# Patient Record
Sex: MALE | Race: WHITE | NOT HISPANIC OR LATINO | Employment: FULL TIME | URBAN - METROPOLITAN AREA
[De-identification: names, ages, dates, MRNs, and addresses within clinical notes are randomized per-mention and may not be internally consistent; named-entity substitution may affect disease eponyms.]

---

## 2023-09-06 ENCOUNTER — TRANSFERRED RECORDS (OUTPATIENT)
Dept: HEALTH INFORMATION MANAGEMENT | Facility: CLINIC | Age: 48
End: 2023-09-06

## 2023-09-15 ENCOUNTER — TRANSFERRED RECORDS (OUTPATIENT)
Dept: HEALTH INFORMATION MANAGEMENT | Facility: CLINIC | Age: 48
End: 2023-09-15

## 2023-09-18 ENCOUNTER — TRANSFERRED RECORDS (OUTPATIENT)
Dept: HEALTH INFORMATION MANAGEMENT | Facility: CLINIC | Age: 48
End: 2023-09-18

## 2023-09-19 ENCOUNTER — TRANSFERRED RECORDS (OUTPATIENT)
Dept: HEALTH INFORMATION MANAGEMENT | Facility: CLINIC | Age: 48
End: 2023-09-19

## 2023-09-21 ENCOUNTER — TRANSFERRED RECORDS (OUTPATIENT)
Dept: HEALTH INFORMATION MANAGEMENT | Facility: CLINIC | Age: 48
End: 2023-09-21

## 2023-09-26 ENCOUNTER — TRANSFERRED RECORDS (OUTPATIENT)
Dept: HEALTH INFORMATION MANAGEMENT | Facility: CLINIC | Age: 48
End: 2023-09-26

## 2023-10-03 ENCOUNTER — TRANSFERRED RECORDS (OUTPATIENT)
Dept: HEALTH INFORMATION MANAGEMENT | Facility: CLINIC | Age: 48
End: 2023-10-03

## 2023-10-03 LAB — INR (EXTERNAL): 1.1 (ref 0.8–1.1)

## 2023-10-05 ENCOUNTER — TRANSCRIBE ORDERS (OUTPATIENT)
Dept: OTHER | Age: 48
End: 2023-10-05

## 2023-10-05 DIAGNOSIS — C19 COLORECTAL CANCER (H): Primary | ICD-10-CM

## 2023-10-05 DIAGNOSIS — C19 MALIGNANT NEOPLASM OF RECTOSIGMOID JUNCTION (H): ICD-10-CM

## 2023-10-06 ENCOUNTER — TRANSFERRED RECORDS (OUTPATIENT)
Dept: HEALTH INFORMATION MANAGEMENT | Facility: CLINIC | Age: 48
End: 2023-10-06

## 2023-10-06 ENCOUNTER — PRE VISIT (OUTPATIENT)
Dept: ONCOLOGY | Facility: CLINIC | Age: 48
End: 2023-10-06

## 2023-10-06 NOTE — TELEPHONE ENCOUNTER
Action November 10, 2023 9:09 AM ABT   Action Taken Called [facility] [phone], they were not able to find request. Verbally request records for 11/02/23 & 10/03/23 path reports, [facility] report and image reports 09/26/23 MR & 10/06/23 MR Brain    Called and spoke to pt, he has not been seen with [facility].    4:09 PM  Records from [place] received and sent to [facility] for upload (30 pages)     Imaging Received  November 2, 2023 12:41 PM ABT   Action: Images from [facility] received and resolved to PACS.     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 Dirk Beard  [facility]  [address] [place] [address]    Cristino Phelan, RN    10/11/23: Michelle Boyer FNP-C   OFFICE NOTE from Radiation onc [facility] 09/26/23: Dr. Marcio Estrada   OFFICE NOTE from surgeon [facility] 09/25/23: Dr. Marcio Morales   Molecular, next generation sequencing studies   (Recs are scanned in their system; common ones: Zachary Kwan, [facility]) [facility]   MEDICATION LIST External: [place]    PATHOLOGY REPORTS  (Path slides are not needed unless asked by the clinical team) External: [place] 09/15/23: [id number]  10/03/23: [id number]   IMAGING (NEED IMAGES & REPORT)     CT SCANS PACS Emma:  09/19/23: CT CAP   MRI PACS PACS:  09/26/23: MR Pelvis    Emma:  10/06/23: MR Brain   ULTRASOUND PACS Emma:  10/03/23: US Liver

## 2023-10-10 ENCOUNTER — PATIENT OUTREACH (OUTPATIENT)
Dept: ONCOLOGY | Facility: CLINIC | Age: 48
End: 2023-10-10

## 2023-10-10 NOTE — PROGRESS NOTES
DTC referral received from Dr Carmelita Beard, Onc at [facility], for pt with metastatic rectal cancer.      HX: 9/15/23 colonoscopy at [facility] [place] showing 2 rectal masses, bx + for mod diff adenocarcinoma. 9/18 CEA 58.4, 9/19 CT CAP with hepatic mets, 9/21 Pt met w/ Dr Mae at [place] to initiate care. 9/26 MRI liver (need this report/img), 10/6 MRI Brain (need this report/img), 9/25 Dr Morales Gen Surg consult at [place] (see in CE), 9/26 Dr Estrada ([place]? Need this note), liver bx ___when? (Need path report). Dr Mae sent for [employer] [facility] (need these reports).     Pt also getting recs at [facility] (?), [facility]. Per referral from Dr Mae, pt is interested in clinical trials. Will offer next available DTC consult for screening & evaluation at [facility].    Stoney Alonso RN  Oncology Nurse Navigator  [facility]  [phone]

## 2023-10-13 ENCOUNTER — TRANSFERRED RECORDS (OUTPATIENT)
Dept: HEALTH INFORMATION MANAGEMENT | Facility: CLINIC | Age: 48
End: 2023-10-13

## 2023-10-20 ENCOUNTER — TELEPHONE (OUTPATIENT)
Dept: ONCOLOGY | Facility: CLINIC | Age: 48
End: 2023-10-20

## 2023-10-20 NOTE — TELEPHONE ENCOUNTER
Spoke with patient's wife about clinical trials.  Overview of Phase 1 clinical trials, time commitment, housing, financial, etc.  She will let Stoney Alonso know if she wants a televisit or in person to meet with a DTC physician.

## 2023-10-30 ENCOUNTER — PATIENT OUTREACH (OUTPATIENT)
Dept: ONCOLOGY | Facility: CLINIC | Age: 48
End: 2023-10-30

## 2023-10-30 NOTE — PROGRESS NOTES
"I spoke to pt's wife, Kecia. She inquired if pt would be able to get a consult which discussed trial options as well as a \"general 2nd opinion\" regarding pt's current cancer care plan. Local Onc is Dr Mae, [facility], [facility].     We discussed that the role of DTC visit is to review work up data, plan of care to date and recommendations for future clinical trials that pt may be eligible for enrollment.     Any Onc staffing the DTC rotation would be able to discuss the topics above but Dr Sandra is likely best matched due to his GI cancer specialty and DTC experience at [facility]. Pt/wife accepted video visit w/ Dr Sandra 11/17 Fri 4pm; they will be within [place] borders for this video visit. Code as DTC since trial screening is the priority.    Stoney Alonso RN  Oncology Nurse Navigator  [facility]  [phone]       "

## 2023-11-02 ENCOUNTER — TRANSFERRED RECORDS (OUTPATIENT)
Dept: HEALTH INFORMATION MANAGEMENT | Facility: CLINIC | Age: 48
End: 2023-11-02
Payer: COMMERCIAL

## 2023-11-06 ENCOUNTER — TRANSFERRED RECORDS (OUTPATIENT)
Dept: HEALTH INFORMATION MANAGEMENT | Facility: CLINIC | Age: 48
End: 2023-11-06
Payer: COMMERCIAL

## 2023-11-06 LAB
ALT SERPL-CCNC: 15 IU/L (ref 7–52)
AST SERPL-CCNC: 16 IU/L (ref 13–39)
CREATININE (EXTERNAL): 0.92 MG/DL (ref 0.7–1.3)
GFR ESTIMATED (EXTERNAL): 88 ML/MIN/1.73M2
GFR ESTIMATED (IF AFRICAN AMERICAN) (EXTERNAL): >90 ML/MIN/1.73M2
GLUCOSE (EXTERNAL): 92 MG/DL (ref 70–105)
POTASSIUM (EXTERNAL): 4.6 MEQ/L (ref 3.5–5.1)

## 2023-11-10 ENCOUNTER — PATIENT OUTREACH (OUTPATIENT)
Dept: ONCOLOGY | Facility: CLINIC | Age: 48
End: 2023-11-10
Payer: COMMERCIAL

## 2023-11-10 ENCOUNTER — TRANSFERRED RECORDS (OUTPATIENT)
Dept: HEALTH INFORMATION MANAGEMENT | Facility: CLINIC | Age: 48
End: 2023-11-10
Payer: COMMERCIAL

## 2023-11-10 NOTE — TELEPHONE ENCOUNTER
[facility]: Cancer Care                                                                                          Returned call to spouse Kecia who stated pt has since received his [facility] report and she was wondering if more recent records had been received from [facility]. Informed her as pt is a new patient, there are navigational staff who requests records prior to his visit and the last note pertaining to receiving records was dated today. Asked her and pt to sign up for [employer] and they can also attach any new records they receive, [employer] sign up link texted to pt phone.    Signature:  Naveed Reyes RN

## 2023-11-17 ENCOUNTER — VIRTUAL VISIT (OUTPATIENT)
Dept: ONCOLOGY | Facility: CLINIC | Age: 48
End: 2023-11-17
Attending: INTERNAL MEDICINE
Payer: COMMERCIAL

## 2023-11-17 VITALS — WEIGHT: 236 LBS | BODY MASS INDEX: 28.74 KG/M2 | HEIGHT: 76 IN

## 2023-11-17 DIAGNOSIS — C20 RECTAL CANCER (H): Primary | ICD-10-CM

## 2023-11-17 PROCEDURE — 99205 OFFICE O/P NEW HI 60 MIN: CPT | Mod: 95 | Performed by: STUDENT IN AN ORGANIZED HEALTH CARE EDUCATION/TRAINING PROGRAM

## 2023-11-17 ASSESSMENT — PAIN SCALES - GENERAL: PAINLEVEL: NO PAIN (0)

## 2023-11-17 NOTE — PROGRESS NOTES
[facility] - Medical Oncology TeleHealth Consult Note  2023    Patient Identifiers     Name: Vern Oconnor  Preferred Address: [place]  Preferred Language: English  : 1975  Gender: male    Assessment and Plan     Mr. Vern Oconnor is a 48 year old male with minimal medical history who presents for initial virtual visit for metastatic rectal cancer.    NGS: KRAS G13D, NRAS wild type, AKT1 E17K, APC *, BCORL S292*, FREDERICK/RAFwt  Immuno: KARLA, TMB-low  Clinical Trial: N/A    Mr. Vern Oconnor presents for second opinion and management counseling for newly diagnosed metastatic rectal cancer. Given patient's metastatic disease is limited to the liver, we generally agree with FOLFOX-based chemotherapies. Per the CHINO trial, the addition of further chemotherapy and antibody agents may increase response rates if the treatment goal may be to consider subsequent liver-directed consolidation. Given the current size and number of liver lesions, he would not currently be a candidate for any locoregional liver-directed therapies.However, he may benefit from a Surgical Oncology consultation at the  to review his case.    Another option would be to consider Hepatic Arterial Infusion Pump therapy either as consolidation or as second-line therapy. These treatment options would depend on restaging following 3 months of initial treatment. We would recommend restaging with CT C/A/P and MR Liver at that time. For now, we recommend that patient continue treatment by his primary oncological team. We will arrange for imaging in 2 months followed by return visit with [facility] to consider the treatment options outlined above.    60 minutes spent on the date of the encounter doing chart review, review of test results, interpretation of tests, patient visit, and documentation     Diego Sandra MD, PhD   of Medicine  Division of Hematology, Oncology and Transplantation  [facility]  [place]    -----------------------------------    Oncology Summary     Cancer Staging   No matching staging information was found for the patient.      Oncology History    No history exists.       Subjective/Interval Events     - pt recently began FOLFOX chemotherapy for rectal cancer metastatic to liver. Unfortunately, there are greater than 10 lesions in the liver, with confluence  - tolerating treatment well  - eating okay. Stable weight  - nausea well controlled with PRN's    Objective Data     Lab data:  I have personally reviewed the lab data, notable for:    - CEA 28    Radiology data:  I have personally reviewed the radiology data, notable for:  10/06/2023 MR Brain  - Unremarkable    09/26/2023 MR Pelvis  - There is a high and low rectal malignancy  - MR stage: T3    Pathology and other data:  I have personally reviewed and interpreted the pathology data, notable for:    10/03/2023 Liver biopsy  Metastatic colorectal adenocarcinoma    Billing Stuff     Virtual Visit Details    Type of service:  Video Visit     Originating Location (pt. Location): Home    Distant Location (provider location):  On-site  Platform used for Video Visit: [employer]

## 2023-11-17 NOTE — NURSING NOTE
Is the patient currently in the state of [place]? YES    Visit mode:VIDEO    If the visit is dropped, the patient can be reconnected by: VIDEO VISIT: Send to e-mail at: [email or web address]    Will anyone else be joining the visit? NO  (If patient encounters technical issues they should call [phone]    How would you like to obtain your AVS? MyChart    Are changes needed to the allergy or medication list? No    Reason for visit: Video Visit (New apt )    Caitlyn MARIANO

## 2023-11-28 ENCOUNTER — TRANSFERRED RECORDS (OUTPATIENT)
Dept: HEALTH INFORMATION MANAGEMENT | Facility: CLINIC | Age: 48
End: 2023-11-28
Payer: COMMERCIAL

## 2023-11-28 ENCOUNTER — PATIENT OUTREACH (OUTPATIENT)
Dept: ONCOLOGY | Facility: CLINIC | Age: 48
End: 2023-11-28
Payer: COMMERCIAL

## 2023-11-28 NOTE — PROGRESS NOTES
New Patient Oncology Nurse Navigator Note     Referring provider: Dr. Sandra     Referring Clinic/Organization: [facility]     Referred to: Surgical Oncology -  Hepatobiliary / GI Cancers     Requested provider (if applicable): Dr. Aldair Sylvester     Referral Received: 11/28/23       Evaluation for : Colon cancer with liver mets      Clinical History (per Nurse review of records provided):      See book marked documents:     Referring MD office note  Pathology report  Imaging reports   Procedure report       No Known Allergies     No past medical history on file.    No past surgical history on file.    No current outpatient medications on file.        There is no problem list on file for this patient.        Clinical Assessment / Barriers to Care (Per Nurse):    None at this time.     Records Location:     [facility] Everywhere     Records Needed:     ABDOMINAL IMAGING (CT SCANS, MRI, US, PET SCANS)  DATING BACK TO 2018      Additional testing needed prior to consult:     NONE AT THIS TIME    Referral updates and Plan:       Consult with Surgical Oncology     11/28/2023 11:11 AM - Called and left a message for patient to call back and schedule consult.     11/28/2023 3:48 PM - called and spoke with patient, informed him we received a referral for him to meet with liver surgeon. He reports his wife handles the appointments, informed him I left her a message as well and if he would prefer they can call back when they are available. Informed him that we can do initial visit as virtual if he would like as Dr. Sylvester is licensed in [place]. He verbalized understanding and will have his wife call back to schedule.     Melinda Goncalves, RN, BSN   Surgical Oncology New Patient Nurse Navigator  [facility]  [phone]

## 2023-12-06 NOTE — TELEPHONE ENCOUNTER
RECORDS STATUS - ALL OTHER DIAGNOSIS      RECORDS RECEIVED FROM: [facility], [facility]   DATE RECEIVED:    NOTES STATUS DETAILS   OFFICE NOTE from referring provider Epic 11/17/23: Dr. Diego Sandra   OFFICE NOTE from medical oncologist External: [facility] 10/24/23: Dr. Dirk Beard   OFFICE NOTE from other specialist External: [facility] Rad Onc:  09/26/23: Dr. Marcio Estrada     Surgeon:  09/25/23: Dr. Marcio Morales   OPERATIVE REPORT External: [facility] 09/15/23: Colonoscopy   MEDICATION LIST [facility]    LABS     PATHOLOGY REPORTS Reports External: [facility] 09/15/23: [id number]  10/03/23: [id number]   ANYTHING RELATED TO DIAGNOSIS External: [facility] Most recent 11/06/23   GENONOMIC TESTING     TYPE: External: [facility]  09/23/23   IMAGING (NEED IMAGES & REPORT)     CT SCANS PACS 11/28/23, 09/1/23: CT CAP   MRI PACS 10/06/23: MR Brain  09/26/23: MR Pelvis   ULTRASOUND PACS 10/03/23: US Liver

## 2023-12-08 NOTE — PROGRESS NOTES
Virtual Visit Details    Type of service:  Video Visit     Originating Location (pt. Location): Home    Distant Location (provider location):  On-site  Platform used for Video Visit: [facility]            Department of Surgery  Division of Surgical Oncology    New Patient Consultation  Dec 11, 2023    Vern Oconnor is a 48 year old male who presents with CRLM.  He was referred by Dr Sandra. He is joined today by his wife.    History of Present Illness     Treatment summary (abbreviated for colorectal liver mets):    Pre-diagnosis symptoms: Hematochezia  Diagnosis date: 9/15/23  CEA at diagnosis: 58.4  Path:  -MMR status: pMMR/KARLA  -Other mutations (BRAF/KRAS/etc): KRAS G13D, NRAS WT, KEI WT    Liver disease first diagnosed: 9/15/23  Synchronous or metachronous: Synchronous  Evidence of extrahepatic metastases? No  Evidence of liver dysfunction (splenomegaly, LFT abnormalities, platelet count, etc)? No    Chemo:  1st line: FOLFOX + Avastin  2nd line: NA  Estimated number of chemo cycles including oxaliplatin: 5    Surgeries:  Primary: NA  -Path: NA  Liver: None    Liver biopsy? Yes  Date: 10/3/23  Modality: US  Dx: metastatic colorectal adenocarcinoma    Date of last colonoscopy: Dx, 9/15/23  Most recent CEA: 13    HPI/clinical updates:   Vern is a gentleman currently receiving care in [place] for Stage IV rectal adenocarcinoma. Colonoscopy revealed multifocal disease in his rectum, staged as T3 on MRI.         Past Medical History:   Diagnosis Date    Gastroesophageal reflux disease without esophagitis        History reviewed. No pertinent surgical history.    Current Outpatient Medications   Medication Sig Dispense Refill    magnesium 250 MG tablet Take 1 tablet by mouth daily      pantoprazole sodium (PROTONIX) 40 MG packet Take 1 packet by mouth daily      vitamin B-Complex Take 1 tablet by mouth daily          No Known Allergies     Social History:   reports that he has never smoked. He has never used smokeless  "tobacco.    Family History:  No family history on file.        Labs:  LFTs and plt count normal on labs 11/6/23 (see \"Media\" tab)    Imaging:  I have personally reviewed images from his CT 11/28/23. This showed diffuse bilobar hepatic disease. There are some borderline portal lymph nodes.    Outside Records:  CT CAP dated 9/19/23:  Multiple hypoenhancing lesions scattered throughout the liver, descried in both the right and left lobe. No lymphadenopathy or distant metastases. Rectal wall thickening.    Assessment & Plan     Vern Oconnor is a 48 year old male with Stage IV rectal adenocarcinoma.    The natural history of colorectal liver metastases was discussed with the patient and accompanying family members.    For patients with colon or rectal cancer that has metastasized to the liver (colorectal liver metastases, abbreviated CRLM), this represents Stage IV disease. We know that, unlike many other cancers, Stage IV colorectal cancer with metastases to the liver has the potential for cure through a combination of chemotherapy and surgery, assuming that patients have disease limited only to the colon/rectum and liver. In this case, the patient has diffuse bilobar liver disease but no other sites of disease outside of his rectum.     For cases like this, we recommend a combination of chemotherapy and surgery with the goal of cure.  The potential for complete pathologic response with chemotherapy alone is low, and we know that complete response on imaging does not correlate with pathologic findings at surgery. With a combination of chemotherapy and surgery (resection and/or ablation) that addresses all known lesions, survival rates can be as high as 40% at five years from diagnosis and 30% at 10 years from diagnosis. Important variables in determining long-term outcomes include tumor size, tumor number, timing from diagnosis to the development of liver metastases, whether the cancer is present in lymph nodes, tumor " markers, and tumor genetics, among many other variables.     Surgery can be incorporated at any point - before, during, or after chemotherapy or radiation - and the exact locations and sizes of the tumors in the liver dictate what surgical strategies are available. I pointed out that the treatment options and the exact order or sequence of the options can seem confusing; I reassured them that we would review their case in a multidisciplinary group and provide a clear recommendation for treatment.    In this case, I see numerous lesions in the liver. The size and location of these lesions are likely unresectable at this time. Therefore, I agree with the plan for systemic chemotherapy. We had a lengthy discussion about options that might include hepatic artery infusion pump. I did make it clear that there are no reports of hepatic artery infusion pump chemotherapy for patients with synchronous disease in their rectum. All HAIP treatments are done after taking out the primary tumor; having the primary tumor in place means we cannot reduce the dose of systemic chemotherapy, removing some of the benefit of HAIP. They asked excellent questions about options for HAIP with or without taking out his rectum. I made it clear that there is still an option here but it might be a treatment that is not supported by any data. Removing the rectum in someone with stage IV disease is rarely done due to the morbidity of the procedure and the possibility of complications that could delay the return to chemotherapy.    In summary, there are still many options for his care. I would continue systemic chemotherapy for 12 cycles and then re-evaluate.    All questions were answered to their apparent satisfaction, and contact information was provided should additional questions or concerns arise.    Finally, I recommended they visit the [facility] Facebook group and gain additional treatment information and patient experiences through that  resource.    Plan Summary:  -Follow-up CT CAP (to be done locally) after 12th cycle of chemo; Dr. Sandra and I can review his imaging once available and determine what appointments/recommendations to make      Aldair Sylvester MD MPHS      Today's visit was centered around a known metastatic cancer diagnosis. The risk of additional morbidity or mortality with or without further treatment is high. Total time spent was 90 minutes, including but not limited to patient-facing time, chart review, review of tests/studies as noted above, and care coordination.

## 2023-12-11 ENCOUNTER — PRE VISIT (OUTPATIENT)
Dept: SURGERY | Facility: CLINIC | Age: 48
End: 2023-12-11
Payer: COMMERCIAL

## 2023-12-11 ENCOUNTER — VIRTUAL VISIT (OUTPATIENT)
Dept: SURGERY | Facility: CLINIC | Age: 48
End: 2023-12-11
Attending: STUDENT IN AN ORGANIZED HEALTH CARE EDUCATION/TRAINING PROGRAM
Payer: COMMERCIAL

## 2023-12-11 VITALS — HEIGHT: 76 IN | WEIGHT: 245 LBS | BODY MASS INDEX: 29.83 KG/M2

## 2023-12-11 DIAGNOSIS — C78.7 RECTAL ADENOCARCINOMA METASTATIC TO LIVER (H): Primary | ICD-10-CM

## 2023-12-11 DIAGNOSIS — C20 RECTAL ADENOCARCINOMA METASTATIC TO LIVER (H): Primary | ICD-10-CM

## 2023-12-11 DIAGNOSIS — C20 RECTAL CANCER (H): ICD-10-CM

## 2023-12-11 PROCEDURE — 99417 PROLNG OP E/M EACH 15 MIN: CPT | Mod: VID | Performed by: SURGERY

## 2023-12-11 PROCEDURE — 99205 OFFICE O/P NEW HI 60 MIN: CPT | Mod: VID | Performed by: SURGERY

## 2023-12-11 RX ORDER — PANTOPRAZOLE SODIUM 40 MG/1
1 FOR SUSPENSION ORAL DAILY
COMMUNITY

## 2023-12-11 RX ORDER — MULTIVITAMIN WITH IRON
1 TABLET ORAL DAILY
COMMUNITY

## 2023-12-11 ASSESSMENT — PAIN SCALES - GENERAL: PAINLEVEL: NO PAIN (0)

## 2023-12-11 NOTE — NURSING NOTE
Is the patient currently in the state of [place]? NO Patient is in [place], provider licensed for [place]    Visit mode:VIDEO    If the visit is dropped, the patient can be reconnected by: VIDEO VISIT: Send to e-mail at: [email or web address]    Will anyone else be joining the visit? NO  (If patient encounters technical issues they should call [phone]    How would you like to obtain your AVS? MyChart    Are changes needed to the allergy or medication list? Pt stated no changes to allergies and Pt stated no med changes    Reason for visit: Consult    Asia Leung LPN

## 2023-12-11 NOTE — LETTER
12/11/2023         RE: Vern Oconnor  [address] [facility] [address]        Dear Colleague,    Thank you for referring your patient, Vern Oconnor, to the [facility]. Please see a copy of my visit note below.    Virtual Visit Details    Type of service:  Video Visit     Originating Location (pt. Location): Home    Distant Location (provider location):  On-site  Platform used for Video Visit: [facility]            Department of Surgery  Division of Surgical Oncology    New Patient Consultation  Dec 11, 2023    Vern Oconnor is a 48 year old male who presents with CRLM.  He was referred by Dr Sandra. He is joined today by his wife.    History of Present Illness    Treatment summary (abbreviated for colorectal liver mets):    Pre-diagnosis symptoms: Hematochezia  Diagnosis date: 9/15/23  CEA at diagnosis: 58.4  Path:  -MMR status: pMMR/KARLA  -Other mutations (BRAF/KRAS/etc): KRAS G13D, NRAS WT, KEI WT    Liver disease first diagnosed: 9/15/23  Synchronous or metachronous: Synchronous  Evidence of extrahepatic metastases? No  Evidence of liver dysfunction (splenomegaly, LFT abnormalities, platelet count, etc)? No    Chemo:  1st line: FOLFOX + Avastin  2nd line: NA  Estimated number of chemo cycles including oxaliplatin: 5    Surgeries:  Primary: NA  -Path: NA  Liver: None    Liver biopsy? Yes  Date: 10/3/23  Modality: US  Dx: metastatic colorectal adenocarcinoma    Date of last colonoscopy: Dx, 9/15/23  Most recent CEA: 13    HPI/clinical updates:   Vern is a gentleman currently receiving care in [place] for Stage IV rectal adenocarcinoma. Colonoscopy revealed multifocal disease in his rectum, staged as T3 on MRI.         Past Medical History:   Diagnosis Date    Gastroesophageal reflux disease without esophagitis        History reviewed. No pertinent surgical history.    Current Outpatient Medications   Medication Sig Dispense Refill    magnesium 250 MG tablet Take 1 tablet by mouth daily       "pantoprazole sodium (PROTONIX) 40 MG packet Take 1 packet by mouth daily      vitamin B-Complex Take 1 tablet by mouth daily          No Known Allergies     Social History:   reports that he has never smoked. He has never used smokeless tobacco.    Family History:  No family history on file.        Labs:  LFTs and plt count normal on labs 11/6/23 (see \"Media\" tab)    Imaging:  I have personally reviewed images from his CT 11/28/23. This showed diffuse bilobar hepatic disease. There are some borderline portal lymph nodes.    Outside Records:  CT CAP dated 9/19/23:  Multiple hypoenhancing lesions scattered throughout the liver, descried in both the right and left lobe. No lymphadenopathy or distant metastases. Rectal wall thickening.    Assessment & Plan    Vern Oconnor is a 48 year old male with Stage IV rectal adenocarcinoma.    The natural history of colorectal liver metastases was discussed with the patient and accompanying family members.    For patients with colon or rectal cancer that has metastasized to the liver (colorectal liver metastases, abbreviated CRLM), this represents Stage IV disease. We know that, unlike many other cancers, Stage IV colorectal cancer with metastases to the liver has the potential for cure through a combination of chemotherapy and surgery, assuming that patients have disease limited only to the colon/rectum and liver. In this case, the patient has diffuse bilobar liver disease but no other sites of disease outside of his rectum.     For cases like this, we recommend a combination of chemotherapy and surgery with the goal of cure.  The potential for complete pathologic response with chemotherapy alone is low, and we know that complete response on imaging does not correlate with pathologic findings at surgery. With a combination of chemotherapy and surgery (resection and/or ablation) that addresses all known lesions, survival rates can be as high as 40% at five years from diagnosis and " 30% at 10 years from diagnosis. Important variables in determining long-term outcomes include tumor size, tumor number, timing from diagnosis to the development of liver metastases, whether the cancer is present in lymph nodes, tumor markers, and tumor genetics, among many other variables.     Surgery can be incorporated at any point - before, during, or after chemotherapy or radiation - and the exact locations and sizes of the tumors in the liver dictate what surgical strategies are available. I pointed out that the treatment options and the exact order or sequence of the options can seem confusing; I reassured them that we would review their case in a multidisciplinary group and provide a clear recommendation for treatment.    In this case, I see numerous lesions in the liver. The size and location of these lesions are likely unresectable at this time. Therefore, I agree with the plan for systemic chemotherapy. We had a lengthy discussion about options that might include hepatic artery infusion pump. I did make it clear that there are no reports of hepatic artery infusion pump chemotherapy for patients with synchronous disease in their rectum. All HAIP treatments are done after taking out the primary tumor; having the primary tumor in place means we cannot reduce the dose of systemic chemotherapy, removing some of the benefit of HAIP. They asked excellent questions about options for HAIP with or without taking out his rectum. I made it clear that there is still an option here but it might be a treatment that is not supported by any data. Removing the rectum in someone with stage IV disease is rarely done due to the morbidity of the procedure and the possibility of complications that could delay the return to chemotherapy.    In summary, there are still many options for his care. I would continue systemic chemotherapy for 12 cycles and then re-evaluate.    All questions were answered to their apparent satisfaction,  and contact information was provided should additional questions or concerns arise.    Finally, I recommended they visit the [employer] Facebook group and gain additional treatment information and patient experiences through that resource.    Plan Summary:  -Follow-up CT CAP (to be done locally) after 12th cycle of chemo; Dr. Sandra and I can review his imaging once available and determine what appointments/recommendations to make      Aldair Sylvester MD MPHS      Today's visit was centered around a known metastatic cancer diagnosis. The risk of additional morbidity or mortality with or without further treatment is high. Total time spent was 90 minutes, including but not limited to patient-facing time, chart review, review of tests/studies as noted above, and care coordination.

## 2023-12-11 NOTE — PROGRESS NOTES
"Virtual Visit Details    Type of service:  Video Visit     Originating Location (pt. Location): {video visit patient location:205750::\"Home\"}  {PROVIDER LOCATION On-site should be selected for visits conducted from your clinic location or adjoining [facility] hospital, academic office, or other nearby [facility] building. Off-site should be selected for all other provider locations, including home:175512}  Distant Location (provider location):  {virtual location provider:506202}  Platform used for Video Visit: {Virtual Visit Platforms:808424::\"[employer]\"}    "

## 2024-01-18 ENCOUNTER — PATIENT OUTREACH (OUTPATIENT)
Dept: ONCOLOGY | Facility: CLINIC | Age: 49
End: 2024-01-18
Payer: COMMERCIAL

## 2024-01-18 NOTE — PROGRESS NOTES
[facility]: Cancer Care Follow-Up Note                                    Discussion with Patient or Care Team:                                                      Called and left a message with oncology clinic to get update on patients treatment schedule and tentative completion date and restaging scans. Provided my direct line for call back with info.     Dates of Treatment:                                                      Patient getting chemo at outside hospital, plan is for 12 cycles of chemotherapy and restaging imaging.       Assessment:                                                      No assessment indicated    Intervention/Education provided during outreach:                                                       None at this time.       Plan/Follow up:                                                         To be determined pending timing of chemo completion.       Melinda Goncalves RN   Hepatobiliary and Surgical Oncology RNCC

## 2024-01-18 NOTE — PROGRESS NOTES
[facility]: Cancer Care Follow-Up Note                                    Discussion with Patient or Care Team:                                                      Received call back from patients local oncology clinic confirming he has completed 8 cycles of chemotherapy. He is tentatively scheduled for follow up scan on 3/20 pending no delays in treatment, with a follow up with provider the following week.     Dates of Treatment:                                                      Completed 8 cycles.       Assessment:                                                        No assessment indicated    Intervention/Education provided during outreach:                                                       None at this time.       Plan/Follow up:                                                         Will follow up closer to end of treatment to confirm no delays and request outside scans.      Melinda Goncalves RN   Hepatobiliary and Surgical Oncology RNCC

## 2024-03-04 ENCOUNTER — PATIENT OUTREACH (OUTPATIENT)
Dept: SURGERY | Facility: CLINIC | Age: 49
End: 2024-03-04
Payer: COMMERCIAL

## 2024-03-04 NOTE — PROGRESS NOTES
[facility]: Cancer Care Follow-Up Note                                    Discussion with Patient or Care Team:                                                      Returned call to patients wife regarding scheduling follow up. Per report patient is scheduled to finish cycle 12 of chemotherapy on 3/11 and is scheduled for follow up MRI and CT scan on 3/20. Informed her that we will confirm if scans can be pushed and then schedule a follow up based on timing if when we know we can have scans in the system for the visit. Informed her our office will be in touch in the coming week to get the follow up with Dr. Sylvester scheduled.     Informed her I would update Dr. Sandra's team to decide on the timing of their visit as it would be determined by his office.     Dates of Treatment:                                                      Infusion given in last 28 days       None              Assessment:                                                        No assessment indicated      Plan/Follow up:                                                         Message sent to scheduling to contact patient to schedule.         Meilnda Goncalves RN   Hepatobiliary and Surgical Oncology RNCC

## 2024-03-17 NOTE — PROGRESS NOTES
Department of Surgery  Division of Surgical Oncology    Follow-Up  Mar 25, 2024    Vern Oconnor is a 48 year old male who is being followed for CRLM. Last seen on 12/11/23.    He is joined today by his wife.    History of Present Illness   Briefly, Vern was diagnosed with rectal cancer in 9/2023, pMMR, KRAS mut, BRAF wt, synchronous disease confirmed with liver biopsy.    He has been receiving FOLFOX + Avastin since 10/11/23. He has completed 12 cycles. CEA in January was 3.2. He has been mildly thrombocytopenia during treatment. He has had a left gluteal lesion that has been bleeding intermittent; it was biopsied in November, with pathology reporting this to be a benign cyst.    He is scheduled to see Dr. Zhang at [facility] on 3/27/24.  He is at [facility] today and planning to get a rectal MRI followed by medical oncology visit later today.    CEA March 19 1.4    He reports having done quite well on chemotherapy.  He has minimal neuropathy.  He is maintaining his weight.  He says his local oncologist is looking forward to recommendations from the procedure for [facility] to make decisions on the next treatment plan.    Results Reviewed:  Labs:  MRI from     I, Aldair Sylvester, personally reviewed the above studies.      Assessment & Plan     Vern Oconnor is a 48 year old male s/p 12 cycles of FOLFOX + avastin for CRLM.    I reviewed Vern's most recent MRI.  I do not have the outside report, but we will see the images.  Of note he did a rectal MRI at [facility] later today so it is unclear from my assessment this case currently it is primary tumor responding the same and that his liver tumors are.    On review of his liver MRI it is clear he still has by lobar disease, with about 20 lesions scattered throughout both lobes.  His left lobe is a little bit on the small side and are several lesions on that side so I do not see any good option for resection review the right of the left to help us achieve clearance and a staged approach.   He has had a great response to his chemotherapy so I do not think that surgical intervention, either with resection, ablation, or pump, will substantially change the outcome at this point in time.  Pump chemotherapy could help him further decrease the size of these lesions but systemic therapy is already doing that so I would continue this approach.  I do recognize that he has gone through 12 cycles of oxaliplatin so I agree with his local oncologist that we should probably transition away from that to reduce his neurotoxicity as well as hepatotoxicity.    Overall my summary is that he has had excellent spots to systems chemotherapy.  He remains unresectable.  I would continue with systemic chemotherapy, anticipating a regimen similar to FOLFIRI plus Avastin.  I would think that he would undergo this for about 2 months and then get additional imaging.  We discussed whether he has come to the closer to [place] for imaging.  I think if he continue with a nonoperative approach as I am recommending at this point he can go ahead and try and get his imaging done locally, we can review the imaging, and imaging determine if he needs improved high-resolution images.  He was aware that I might have our radiologist over read local images and that that would be additional bill but could be a way to offer him some convenience.    We have also discussed the management of his primary tumor.  We would do liver directed therapy, we should also consider with due to his rectum.  By enlarge, upon chemotherapy is often considered in patients with a primary removed, but with rectal cancer, this can be much more difficult discussion.  He has not seen a colorectal surgeon or had a new scope since his diagnosis.  I do not think this is necessary at this time, but we did briefly discuss that he might need that in the future if we are pushing forward towards additional interventions with curative intent.    All questions were answered to  their apparent satisfaction, and contact information was provided should additional questions or concerns arise.    Plan Summary:  -Await results of [facility] visits today with Arizmendi and Dr. Sandra here  -He is seeing [facility] later this week and is aware their opinion may be different than mine  -I am recommending additional 2mo of chemotherapy (regimen change, stopping oxali in favor of iri) followed by imaging locally. We will reach back out to him after his clinic visits to see if he is continuing care with us, [facility], or wishes to continue seeking multiple opinions.       Aldair Sylvester MD MPHS      Total time spent was 60 minutes, including but not limited to patient-facing time, chart review, review of tests/studies as noted above, and care coordination.

## 2024-03-21 ENCOUNTER — PATIENT OUTREACH (OUTPATIENT)
Dept: SURGERY | Facility: CLINIC | Age: 49
End: 2024-03-21
Payer: COMMERCIAL

## 2024-03-21 NOTE — PROGRESS NOTES
[facility]: Cancer Care Follow-Up Note                                    Discussion with Patient or Care Team:                                                      Called and spoke with patient's wife to confirm if patient had his scans as previously discussed and plan for 3/20. Per report his scans were moved to [place] and are not being done until 3/24 and then an MRI on 3/26. Informed her that ideally we would reschedule the appointments and I can confirmed we will be able to get the imaging from [place]. This was frustrating to her as she stated that the imaging would be available for his visits at [place], explained these are two different systems and in my experience it has take a few days for scan from [place] to be sent to our system, especially if the official reports are not available. She requested appointment on Wednesday, explained Dr. Sylvester is in clinic on Mondays and every other Friday and operate Tuesday,Wednesday and Thursday so its not an option. Also explained I would have to have scheduling contact her to discuss next option and also review openings with Dr. Sandra's team.     After much discussion, explained I would keep the appointments on Monday for now, but if we don't have imaging by 9 am that I would have to have scheduling reach out and reschedule appointments to later date to ensure we have all work up and imaging available for the appointment. Explained Dr. Sylvester would not be able to give any recommendations without having updated imaging to evaluate for response. Recommend she ask the radiology team on Sunday to send the information to our system to assist in ensuring we get all the information.     She verbalized understanding and is aware I will send a message to Dr. Sandra's team to decide on how to manage their follow up appointment.     Dates of Treatment:                                                      Infusion given in last 28 days       None            No past surgical  history on file.       Assessment:                                                        No assessment indicated    Intervention/Education provided during outreach:                                                       None.       Plan/Follow up:                                                         Patient to follow up as scheduled at next appt      Melinda Goncalves RN   Hepatobiliary and Surgical Oncology RNCC

## 2024-03-25 ENCOUNTER — VIRTUAL VISIT (OUTPATIENT)
Dept: SURGERY | Facility: CLINIC | Age: 49
End: 2024-03-25
Attending: STUDENT IN AN ORGANIZED HEALTH CARE EDUCATION/TRAINING PROGRAM
Payer: COMMERCIAL

## 2024-03-25 ENCOUNTER — VIRTUAL VISIT (OUTPATIENT)
Dept: ONCOLOGY | Facility: CLINIC | Age: 49
End: 2024-03-25
Attending: STUDENT IN AN ORGANIZED HEALTH CARE EDUCATION/TRAINING PROGRAM
Payer: COMMERCIAL

## 2024-03-25 VITALS — BODY MASS INDEX: 30.2 KG/M2 | WEIGHT: 248 LBS | HEIGHT: 76 IN

## 2024-03-25 VITALS — HEIGHT: 76 IN | BODY MASS INDEX: 30.2 KG/M2 | WEIGHT: 248 LBS

## 2024-03-25 DIAGNOSIS — C78.7 RECTAL ADENOCARCINOMA METASTATIC TO LIVER (H): Primary | ICD-10-CM

## 2024-03-25 DIAGNOSIS — C20 RECTAL CANCER (H): Primary | ICD-10-CM

## 2024-03-25 DIAGNOSIS — C20 RECTAL ADENOCARCINOMA METASTATIC TO LIVER (H): Primary | ICD-10-CM

## 2024-03-25 PROCEDURE — 99215 OFFICE O/P EST HI 40 MIN: CPT | Mod: 95 | Performed by: STUDENT IN AN ORGANIZED HEALTH CARE EDUCATION/TRAINING PROGRAM

## 2024-03-25 PROCEDURE — 99215 OFFICE O/P EST HI 40 MIN: CPT | Mod: 95 | Performed by: SURGERY

## 2024-03-25 PROCEDURE — 99417 PROLNG OP E/M EACH 15 MIN: CPT | Mod: 95 | Performed by: SURGERY

## 2024-03-25 ASSESSMENT — PAIN SCALES - GENERAL
PAINLEVEL: NO PAIN (0)
PAINLEVEL: NO PAIN (0)

## 2024-03-25 NOTE — LETTER
3/25/2024         RE: Vern Oconnor  [address] [place] [address]        Dear Colleague,    Thank you for referring your patient, Vern Oconnor, to the [facility]. Please see a copy of my visit note below.    [facility] - Medical Oncology TeleHealth Consult Note  3/25/2024    Patient Identifiers     Name: Vern Oconnor  Preferred Address: [place]  Preferred Language: English  : 1975  Gender: male    Assessment and Plan     Mr. 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    NGS: KRAS G13D, NRAS wild type, AKT1 E17K, APC *, BCORL S292*, FREDERICK/RAFwt  Immuno: KARLA, TMB-low  Clinical Trial: N/A    Mr. Oconnor returns to clinic for continuing second opinion consultation of his metastatic colorectal cancer; mets exclusively to the liver.  Patient's most recent imaging does not demonstrate distributed intra-abdominal disease.  He continues to have liver exclusive metastases, which would be amenable to liver directed treatment.  However, he has responded extremely well to his current regimen, and we agree with both his primary oncologist and our colleagues at [place] that he should continue his current systemic therapy as 5-FU/Abi maintenance.  Given the complexities associated with 5-FU pumps at his home infusion center, he may benefit from a transition to capecitabine after 1 or 2 cycles of 5-FU/Abi.    Of note, KRAS wild-type finding in liver metastatic disease is consistent with reported literature suggesting that this KRAS mutational regression is consistent with oligometastatic disease and with improved prognosis from KRAS mutant CRC.    Regarding potential future liver-directed management, we generally feel that patient's liver disease burden is not appropriate for locoregional therapies, as any  targeted ablative therapy would damage a significant portion of the liver and compromise function.  Again, we agree with [facility] that hepatic arterial infusion pump therapy is the most appropriate treatment option on disease progression.  At that time, patient should be reevaluated by our surgical oncology and colorectal surgery teams, and we can plan for palliative debulking of extrahepatic disease followed by HAIP FUDR/dr-FOLFIRI.    As we expect continued disease response to current regimen for further 6 to 12 months, we will arrange for follow-up in 6 months, to reevaluate patient's disease status and provide further management recommendations.  If patient is noted to have disease progression prior to that point, we recommend he reach out to our clinic and establish follow-up sooner.    45 minutes spent on the date of the encounter doing chart review, review of test results, interpretation of tests, patient visit, and documentation     Diego Sandra MD, PhD   of Medicine  Division of Hematology, Oncology and Transplantation  [facility]    -----------------------------------    Oncology Summary     Cancer Staging   No matching staging information was found for the patient.      Oncology History    No history exists.       Subjective/Interval Events     - he continues to feel generally well without significant treatment-associated side effects  - his primary complaint with treatment is the unwieldy nature of the 5FU pumps. He notes that they are like 'gallon jugs,' and are a significant impediment to his quality of life.   - no constipation, diarrhea, nausea, vomiting  - stable neuropathy    Objective Data     Lab data:  I have personally reviewed the lab data, notable for:    - no notables; all reviewed    Radiology data:  I have personally reviewed the radiology data, notable for:  03/25/2024 MR Abdomen  Impression  Significant interval decrease in the size of the multiple  liver metastases. Scarring adjacent to the liver metastases suggestive of early changes of chemotherapy related scarring ([facility]M).  Interval increase in the spleen likely related to chemotherapy.    Pathology and other data:  I have personally reviewed and interpreted the pathology data, notable for:    - no new data    Billing Stuff     Virtual Visit Details    Type of service:  Video Visit     Originating Location (pt. Location): Home    Distant Location (provider location):  Off-site  Platform used for Video Visit: [facility]        Diego Sandra MD

## 2024-03-25 NOTE — PROGRESS NOTES
Virtual Visit Details    Type of service:  Video Visit     Originating Location (pt. Location): [place], [place]    Distant Location (provider location):  On-site  Platform used for Video Visit: [employer]

## 2024-03-25 NOTE — LETTER
3/25/2024         RE: Vern Oconnor  [address] [place] [address]        Dear Colleague,    Thank you for referring your patient, Vern Oconnor, to the [facility]. Please see a copy of my visit note below.      Department of Surgery  Division of Surgical Oncology    Follow-Up  Mar 25, 2024    Vern Oconnor is a 48 year old male who is being followed for CRLM. Last seen on 12/11/23.    He is joined today by his wife.    History of Present Illness  Briefly, Vern was diagnosed with rectal cancer in 9/2023, pMMR, KRAS mut, BRAF wt, synchronous disease confirmed with liver biopsy.    He has been receiving FOLFOX + Avastin since 10/11/23. He has completed 12 cycles. CEA in January was 3.2. He has been mildly thrombocytopenia during treatment. He has had a left gluteal lesion that has been bleeding intermittent; it was biopsied in November, with pathology reporting this to be a benign cyst.    He is scheduled to see Dr. Zhang at [facility] on 3/27/24.  He is at [facility] today and planning to get a rectal MRI followed by medical oncology visit later today.    CEA March 19 1.4    He reports having done quite well on chemotherapy.  He has minimal neuropathy.  He is maintaining his weight.  He says his local oncologist is looking forward to recommendations from the procedure for [facility] to make decisions on the next treatment plan.    Results Reviewed:  Labs:  MRI from     I, Aldair Sylvester, personally reviewed the above studies.      Assessment & Plan    Vern Oconnor is a 48 year old male s/p 12 cycles of FOLFOX + avastin for CRLM.    I reviewed Vern's most recent MRI.  I do not have the outside report, but we will see the images.  Of note he did a rectal MRI at [facility] later today so it is unclear from my assessment this case currently it is primary tumor responding the same and that his liver tumors are.    On review of his liver MRI it is clear he still has by lobar disease, with about 20 lesions  scattered throughout both lobes.  His left lobe is a little bit on the small side and are several lesions on that side so I do not see any good option for resection review the right of the left to help us achieve clearance and a staged approach.  He has had a great response to his chemotherapy so I do not think that surgical intervention, either with resection, ablation, or pump, will substantially change the outcome at this point in time.  Pump chemotherapy could help him further decrease the size of these lesions but systemic therapy is already doing that so I would continue this approach.  I do recognize that he has gone through 12 cycles of oxaliplatin so I agree with his local oncologist that we should probably transition away from that to reduce his neurotoxicity as well as hepatotoxicity.    Overall my summary is that he has had excellent spots to systems chemotherapy.  He remains unresectable.  I would continue with systemic chemotherapy, anticipating a regimen similar to FOLFIRI plus Avastin.  I would think that he would undergo this for about 2 months and then get additional imaging.  We discussed whether he has come to the closer to [place] for imaging.  I think if he continue with a nonoperative approach as I am recommending at this point he can go ahead and try and get his imaging done locally, we can review the imaging, and imaging determine if he needs improved high-resolution images.  He was aware that I might have our radiologist over read local images and that that would be additional bill but could be a way to offer him some convenience.    We have also discussed the management of his primary tumor.  We would do liver directed therapy, we should also consider with due to his rectum.  By enlarge, upon chemotherapy is often considered in patients with a primary removed, but with rectal cancer, this can be much more difficult discussion.  He has not seen a colorectal surgeon or had a new scope  since his diagnosis.  I do not think this is necessary at this time, but we did briefly discuss that he might need that in the future if we are pushing forward towards additional interventions with curative intent.    All questions were answered to their apparent satisfaction, and contact information was provided should additional questions or concerns arise.    Plan Summary:  -Await results of [facility] visits today with Arizmendi and Dr. Sandra here  -He is seeing [facility] later this week and is aware their opinion may be different than mine  -I am recommending additional 2mo of chemotherapy (regimen change, stopping oxali in favor of iri) followed by imaging locally. We will reach back out to him after his clinic visits to see if he is continuing care with us, [place], or wishes to continue seeking multiple opinions.       Aldair Sylvester MD MPHS      Total time spent was 60 minutes, including but not limited to patient-facing time, chart review, review of tests/studies as noted above, and care coordination.      Virtual Visit Details    Type of service:  Video Visit     Originating Location (pt. Location): [place], [place]    Distant Location (provider location):  On-site  Platform used for Video Visit: Johnnie

## 2024-03-25 NOTE — NURSING NOTE
Is the patient currently in the state of [place]? YES    Visit mode:VIDEO Doximity     If the visit is dropped, the patient can be reconnected by: VIDEO VISIT: Text to cell phone:   Telephone Information:   Mobile [phone]       Will anyone else be joining the visit? Yes, pt's wife Kecia is with the pt and will be joining the video visit per pt  (If patient encounters technical issues they should call [phone]    How would you like to obtain your AVS? Mail a copy    Are changes needed to the allergy or medication list? No    Reason for visit: RECHECK    No other vitals to report per pt     Eulalia MENDEZF

## 2024-03-25 NOTE — PROGRESS NOTES
[facility] - Medical Oncology TeleHealth Consult Note  3/25/2024    Patient Identifiers     Name: Vern Oconnor  Preferred Address: [place]  Preferred Language: English  : 1975  Gender: male    Assessment and Plan     Mr. 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    NGS: KRAS G13D, NRAS wild type, AKT1 E17K, APC *, BCORL S292*, FREDERICK/RAFwt  Immuno: KARLA, TMB-low  Clinical Trial: N/A    Mr. Oconnor returns to clinic for continuing second opinion consultation of his metastatic colorectal cancer; mets exclusively to the liver.  Patient's most recent imaging does not demonstrate distributed intra-abdominal disease.  He continues to have liver exclusive metastases, which would be amenable to liver directed treatment.  However, he has responded extremely well to his current regimen, and we agree with both his primary oncologist and our colleagues at [place] that he should continue his current systemic therapy as 5-FU/Abi maintenance.  Given the complexities associated with 5-FU pumps at his home infusion center, he may benefit from a transition to capecitabine after 1 or 2 cycles of 5-FU/Abi.    Of note, KRAS wild-type finding in liver metastatic disease is consistent with reported literature suggesting that this KRAS mutational regression is consistent with oligometastatic disease and with improved prognosis from KRAS mutant CRC.    Regarding potential future liver-directed management, we generally feel that patient's liver disease burden is not appropriate for locoregional therapies, as any targeted ablative therapy would damage a significant portion of the liver and compromise function.  Again, we agree with [facility] that hepatic arterial infusion pump therapy is the most appropriate treatment option on disease progression.   At that time, patient should be reevaluated by our surgical oncology and colorectal surgery teams, and we can plan for palliative debulking of extrahepatic disease followed by HAIP FUDR/dr-FOLFIRI.    As we expect continued disease response to current regimen for further 6 to 12 months, we will arrange for follow-up in 6 months, to reevaluate patient's disease status and provide further management recommendations.  If patient is noted to have disease progression prior to that point, we recommend he reach out to our clinic and establish follow-up sooner.    45 minutes spent on the date of the encounter doing chart review, review of test results, interpretation of tests, patient visit, and documentation     Diego Sandra MD, PhD   of Medicine  Division of Hematology, Oncology and Transplantation  [facility]    -----------------------------------    Oncology Summary     Cancer Staging   No matching staging information was found for the patient.      Oncology History    No history exists.       Subjective/Interval Events     - he continues to feel generally well without significant treatment-associated side effects  - his primary complaint with treatment is the unwieldy nature of the 5FU pumps. He notes that they are like 'gallon jugs,' and are a significant impediment to his quality of life.   - no constipation, diarrhea, nausea, vomiting  - stable neuropathy    Objective Data     Lab data:  I have personally reviewed the lab data, notable for:    - no notables; all reviewed    Radiology data:  I have personally reviewed the radiology data, notable for:  03/25/2024 MR Abdomen  Impression  Significant interval decrease in the size of the multiple liver metastases. Scarring adjacent to the liver metastases suggestive of early changes of chemotherapy related scarring (CALMCHeM).  Interval increase in the spleen likely related to chemotherapy.    Pathology and other data:  I have personally  reviewed and interpreted the pathology data, notable for:    - no new data    Billing Stuff     Virtual Visit Details    Type of service:  Video Visit     Originating Location (pt. Location): Home    Distant Location (provider location):  Off-site  [facility]

## 2024-03-25 NOTE — NURSING NOTE
Is the patient currently in the state of [place]? YES    Visit mode:VIDEO    If the visit is dropped, the patient can be reconnected by: VIDEO VISIT: Text to cell phone:   Telephone Information:   Mobile [phone]       Will anyone else be joining the visit? NO  (If patient encounters technical issues they should call [phone])    How would you like to obtain your AVS? MyChart    Are changes needed to the allergy or medication list? No    Reason for visit: RECHECK    Osmin MARIANO

## 2024-03-26 ENCOUNTER — PATIENT OUTREACH (OUTPATIENT)
Dept: CARE COORDINATION | Facility: CLINIC | Age: 49
End: 2024-03-26
Payer: COMMERCIAL

## 2024-03-26 NOTE — PROGRESS NOTES
Social Work - Distress Screen Intervention  [facility]    Identified Concern and Score from Distress Screenin. How concerned are you about your ability to eat? 0     2. How concerned are you about unintended weight loss or your current weight? 0     3. How concerned are you about feeling depressed or very sad?  0     4. How concerned are you about feeling anxious or very scared?  0     5. Do you struggle with the loss of meaning and jess in your life?  Not at all     6. How concerned are you about work and home life issues that may be affected by your cancer?  0     7. How concerned are you about knowing what resources are available to help you?  (!) 10     8. Do you currently have what you would describe as Worship or spiritual struggles? Not at all     9. If you want to be contacted by one of our professionals, I can send a message to them right now.  No data recorded     Date of Distress Screen: 3/25/2024  Data: At time of last visit, patient scored positive on distress screening.  outreached to patient today to follow up on elevated distress and introduce psychosocial services and support.  Intervention/Education provided:  contacted patient by phone to discuss distress screening results.  Spoke to wife of Patient about concerns they shared with Patient's Doctor about next steps on their cancer journey. Patient's wife expressed that they do not feel as though they need any other support or resources at this time.   Follow-up Required:  will remain available to patient for support as needed    WADE Brannon Student Intern  Outpatient Specialty Clinics   [facility] and Surgery [facility]  Available Monday, Tuesday, Wednesday  Direct Phone [phone]  : Melvina Craven [facility], [phone]

## 2024-04-03 ENCOUNTER — PATIENT OUTREACH (OUTPATIENT)
Dept: SURGERY | Facility: CLINIC | Age: 49
End: 2024-04-03
Payer: COMMERCIAL

## 2024-04-03 NOTE — PROGRESS NOTES
[facility]: Cancer Care Follow-Up Note                                    Discussion with Patient or Care Team:                                                      Patients wife called to discuss follow up timing and treatment plan that Dr. Sylvester outlined following their visits with both the [facility] and [facility]. Informed her that is note is clear on plan and reviewed plan outlined with her. Informed her that per his note, he recommended proceeding with 2 more months of chemo with a change in regimen. Informed her that he would plan to follow up on imaging at that time which could be done locally if he would like. Informed her that prior to any pump placement he mentioned to me that would like review patients case with our Colorectal team since his primary is still present. Offered appointment with Dr. Sylvester to further discuss as well. Following review of plan outlined by Dr. Sylvester in his note, she did not feel that a visit was needed at this time.     Informed her if his local oncology provider doesn't have our notes, she can update me and we are happy to fax. She verbalized understanding and will update us on her plan.     [facility] will plan to keep patient on radar for ongoing follow up in 2 months.       Dates of Treatment:                                                      Infusion given in last 28 days       None            No past surgical history on file.       Assessment:                                                        No assessment indicated    Intervention/Education provided during outreach:                                                       None that time.       Plan/Follow up:                                                         Follow up timing pending chemotherapy schedule.     Melinda Goncalves RN   Hepatobiliary and Surgical Oncology [facility]

## 2024-06-17 ENCOUNTER — TRANSFERRED RECORDS (OUTPATIENT)
Dept: HEALTH INFORMATION MANAGEMENT | Facility: CLINIC | Age: 49
End: 2024-06-17

## 2024-06-27 ENCOUNTER — TRANSFERRED RECORDS (OUTPATIENT)
Dept: HEALTH INFORMATION MANAGEMENT | Facility: CLINIC | Age: 49
End: 2024-06-27

## 2024-07-02 ENCOUNTER — PATIENT OUTREACH (OUTPATIENT)
Dept: ONCOLOGY | Facility: CLINIC | Age: 49
End: 2024-07-02
Payer: COMMERCIAL

## 2024-07-02 NOTE — PROGRESS NOTES
"Per OV notes from 6/25/24, pt was receiving 5fu/Abi at [facility], he lives in [place], [place]. Dr. Sandra requested follow-up in September 2024 with repeat imaging and labs CBC, CMP, CEA prior.    Pt was also to follow-up with Dr. Sylvester regarding liver directed therapy after restaging scan (due June 2024, per [facility] note).    Spoke with spouse Kecia who stated CT Abd/pelvis and MRI Abd was done at local [facility] 2 weeks ago. Stated pt is currently on \"the oral version of 5fu\". [facility] also scheduled imaging end of July but she did not have exact dates.     She stated they had requested [facility] push imaging and fax reports to [employer], none found in Care Everywhere or media. Writer called [facility] and reached vm, lmcb to fax medical oncology notes and most recent imaging.    She stated they want a virtual visit with Dr. Sylvester same day as Dr. Sandra 9/23. Writer will update Dr. Sylvester's team with request.          "

## 2024-09-12 NOTE — TELEPHONE ENCOUNTER
Spoke with spouse Kecia again to inquire if pt wants to keep follow-up virtual visit with Dr. Sandra 9/23. Asked if recent imaging has been done and what pt is currently taking. Per Kecia, pt is on Avastin/Capecitabine and had scans in either late July or early August. He is scheduled for repeat imaging in October. She stated since he just started this therapy they may want to wait until after October's scans to revisit with Dr. Sandra and Dr. Sylvester. She did state they are still interested in hearing about trials or whether pt can have surgery. Pt is  not tolerating this therapy as well as previous ones and he wants to know if there are other options out there.     Will request imaging from [facility] for providers to review and discuss whether it makes more sense to wait for Oct restaging scans before scheduling follow-up.

## 2024-10-08 ENCOUNTER — TRANSFERRED RECORDS (OUTPATIENT)
Dept: HEALTH INFORMATION MANAGEMENT | Facility: CLINIC | Age: 49
End: 2024-10-08

## 2024-10-16 ENCOUNTER — NURSE TRIAGE (OUTPATIENT)
Dept: ONCOLOGY | Facility: CLINIC | Age: 49
End: 2024-10-16
Payer: COMMERCIAL

## 2024-10-16 NOTE — TELEPHONE ENCOUNTER
Call from spouse, questioning if care team received October scans and labs, as pt had them done recently and they were to be sent.   Writer checked in Care Everywhere and Media tabs, last scans in Media tab are from June.   Spouse said they have asked for scans to be sent from [facility] in [place] a few times, asking if care team can request scans so providers have the most up to date info prior to appts in Nov.   Encounter routed to Nga Calderon.

## 2024-10-17 ENCOUNTER — PATIENT OUTREACH (OUTPATIENT)
Dept: ONCOLOGY | Facility: CLINIC | Age: 49
End: 2024-10-17
Payer: COMMERCIAL

## 2024-10-17 NOTE — TELEPHONE ENCOUNTER
[facility]: Cancer Care                                                                                          Per Dr. Sandra, if pt does decide to proceed with hepatic pump chemo he should meet with Oncology one month after surgery visit. Currently scheduled as a virtual visit on 11/18 with Dr. Sylvester. Spouse also told triage that pt had imaging at [facility]. Care Everywhere shows that [facility] did overreads on scans 10/9 (CT chest with contrast, MRI pelvis w/o contrast and MRI ABd w/wo contrast). Requested imaging push to [facility].    Left message for spouse Kecia that if pt wishes to keep virtual visit with Dr. Sylvester, and decides he will get hepatic pump treatment with [facility], he can schedule in person visit with Dr. Sandra afterward. If he chooses to do this as a virtual visit, he will need to physically be in the state of [place] for visit.    Signature:  Naveed Reyes RN

## 2024-10-18 NOTE — TELEPHONE ENCOUNTER
Returned call to Kecia that pt is currently scheduled with Dr. Sylvester 11/18 and does not need to schedule an appt with Dr. Sandra unless they choose to go ahead with hepatic pump surgery and chemo at [facility]. Asked her to call back with any additional questions.

## 2024-11-12 NOTE — PROGRESS NOTES
[facility]  [address] [address]  Phone: [phone]  Fax: [phone]  Department of Surgery  Division of Surgical Oncology    Follow-Up    Patient:  Vern Oconnor, Date of birth 1975  Date of Visit:  11/12/2024  Referring Provider Referred Self      Assessment & Plan      Vern Oconnor is a 49 year old male being followed for possible liver directed therapy for metastatic rectal cancer    His most recent MRI dated 10/2/24 shows around 13 lesions, all of which are quite evenly distributed to the point where it is difficult to determine if complete liver clearance could be achieved with resection. Ablation is likely a good option for most if not all of these - hardly any of them are near inflow, so I think the risk of biliary injury with so many ablations is not that high.    As a result, I do think open hepatic ablation with hepatic artery infusion pump chemotherapy is appropriate. With so many ablations, I may need to be a bit conservative with my ablation zones, so the chance for local recurrence might be higher than our usual 4% (3yr), and, with so many lesions, I do feel his chance of intrahepatic recurrence is high (as high as 80%), which can be diminished by around 20% or 1/3 with the addition of HAIP. He does have some mild splenomegaly and hepatic steatosis, which could make it slightly more difficult to tolerate a meaningful dose of FUDR, although that is hard to know at this stage.    During our visit, we went through several details, including the possibility of getting pump flushes (once a month, lasting for weeks 3 and 4 of the monthly pump chemo cycle) locally. However, I presented his case at tumor board today, with concern about the report of lung lesions, which his local team reported as stable / present on prior. To my eye, they are larger.    After review at tumor board, there are at four lesions new since his diagnosis and appreciably  larger since prior imaging in the right chest. Admittedly, his tumors are responding in the liver and his CEA is low, so it does seem less likely that these findings in the lung are truly tumor (and are too small to biopsy/prove - could be infectious/inflammatory), but I don't think it would be appropriate to proceed with very aggressive local liver therapy in the setting of new and growing lung lesions. I reached out to him to discuss, and he asked me to call his wife. I spoke with her over the phone for 10 minutes to explain our concern and rationale for the following recommendations:    Plan Summary:  -No plan for liver directed therapy at this time - continues to have response in liver with systemic therapy  -Consider switching systemic therapy (to my knowledge, patient has not been on FOLFIRI + srinivas) and prove stability or treatment response to lung lesions (min 3 months from now) prior to re-considering liver directed therapy. Alternatively, could even consider trialing antibiotics without a change in chemo if we suspect infectious/inflammatory etiology in the lungs, but this might risk progression of disease; will defer to local team  -Will see patient back (virtually) in 3mo after local team obtains new CT CAP and liver MRI    All questions were answered to their apparent satisfaction, and contact information was provided should additional questions or concerns arise.        Aldair Sylvester MD MPHS      Medical Decision Making    Total time spent was 120 minutes, including but not limited to patient-facing time, chart review, review of tests/studies as noted above, and care coordination.             History of Present Illness     Vern Oconnor is a 49 year old male who is being followed for possible liver directed therapy for metastatic rectal cancer    He is joined today by his wife.    UPDATES:   He is currently on a q3 week regimen with capecitabine and Avastin. He has been on this regimen since the spring  (March or April). He had some time off treatment when he dealt with a perianal abscess, which he believes was also around spring and then following that they resumed this treatment regimen of capecitabine and Avastin.    Most recent CEA 1.9, hovering around 1.5 - 2 recently.    Last colonoscopy September 2023 (at time of diagnosis).    Most recent imaging, read at outside hospital, showed (on MRI) stable innumerable liver lesions and (on chest CT) stable pulmonary nodules, 2-5mm each. On pelvic MRI, his rectal tumor was not appreciable.     Results Reviewed:  Multiple outside provider notes reviewed including 1 scanned in dated 11/15/2024, as well as most recent MRI results from October 2024    I, Aldair Sylvester, personally reviewed the above studies.          Virtual Visit Details    Type of service:  Video Visit     Originating Location (pt. Location): Home    Distant Location (provider location):  On-site  Platform used for Video Visit: Johnnie

## 2024-11-18 ENCOUNTER — TUMOR CONFERENCE (OUTPATIENT)
Dept: ONCOLOGY | Facility: CLINIC | Age: 49
End: 2024-11-18
Payer: COMMERCIAL

## 2024-11-18 ENCOUNTER — VIRTUAL VISIT (OUTPATIENT)
Dept: SURGERY | Facility: CLINIC | Age: 49
End: 2024-11-18
Attending: SURGERY
Payer: COMMERCIAL

## 2024-11-18 VITALS — HEIGHT: 76 IN | BODY MASS INDEX: 32.27 KG/M2 | WEIGHT: 265 LBS

## 2024-11-18 DIAGNOSIS — C20 ADENOCARCINOMA OF RECTUM METASTATIC TO LIVER (H): Primary | ICD-10-CM

## 2024-11-18 DIAGNOSIS — C78.7 ADENOCARCINOMA OF RECTUM METASTATIC TO LIVER (H): Primary | ICD-10-CM

## 2024-11-18 PROCEDURE — 99417 PROLNG OP E/M EACH 15 MIN: CPT | Mod: 95 | Performed by: SURGERY

## 2024-11-18 PROCEDURE — 99215 OFFICE O/P EST HI 40 MIN: CPT | Mod: 95 | Performed by: SURGERY

## 2024-11-18 ASSESSMENT — PAIN SCALES - GENERAL: PAINLEVEL_OUTOF10: WORST PAIN (10)

## 2024-11-18 NOTE — NURSING NOTE
Current patient location: [address] [place] [address]  [place] [address]    Is the patient currently in the state of [place]? NO    Visit mode:VIDEO    If the visit is dropped, the patient can be reconnected by:VIDEO VISIT: Text to cell phone:   Telephone Information:   Mobile [phone]       Will anyone else be joining the visit? Yes  (If patient encounters technical issues they should call [phone]    Are changes needed to the allergy or medication list? No    Are refills needed on medications prescribed by this physician? NO    Rooming Documentation:  Questionnaire(s) completed    Reason for visit: RECHNOLAN MARIANO

## 2024-11-18 NOTE — LETTER
11/18/2024      Vern Oconnor  [address] [place] [address]      Dear Colleague,    Thank you for referring your patient, Vern Oconnor, to the [facility]. Please see a copy of my visit note below.      [facility]  [address] [facility] [address]  Phone: [phone]  Fax: [phone]  Department of Surgery  Division of Surgical Oncology    Follow-Up    Patient:  Vern Oconnor, Date of birth 1975  Date of Visit:  11/12/2024  Referring Provider Referred Self      Assessment & Plan     Vern Oconnor is a 49 year old male being followed for possible liver directed therapy for metastatic rectal cancer    His most recent MRI dated 10/2/24 shows around 13 lesions, all of which are quite evenly distributed to the point where it is difficult to determine if complete liver clearance could be achieved with resection. Ablation is likely a good option for most if not all of these - hardly any of them are near inflow, so I think the risk of biliary injury with so many ablations is not that high.    As a result, I do think open hepatic ablation with hepatic artery infusion pump chemotherapy is appropriate. With so many ablations, I may need to be a bit conservative with my ablation zones, so the chance for local recurrence might be higher than our usual 4% (3yr), and, with so many lesions, I do feel his chance of intrahepatic recurrence is high (as high as 80%), which can be diminished by around 20% or 1/3 with the addition of HAIP. He does have some mild splenomegaly and hepatic steatosis, which could make it slightly more difficult to tolerate a meaningful dose of FUDR, although that is hard to know at this stage.    During our visit, we went through several details, including the possibility of getting pump flushes (once a month, lasting for weeks 3 and 4 of the monthly pump chemo cycle) locally. However, I presented his case at tumor board today,  with concern about the report of lung lesions, which his local team reported as stable / present on prior. To my eye, they are larger.    After review at tumor board, there are at four lesions new since his diagnosis and appreciably larger since prior imaging in the right chest. Admittedly, his tumors are responding in the liver and his CEA is low, so it does seem less likely that these findings in the lung are truly tumor (and are too small to biopsy/prove - could be infectious/inflammatory), but I don't think it would be appropriate to proceed with very aggressive local liver therapy in the setting of new and growing lung lesions. I reached out to him to discuss, and he asked me to call his wife. I spoke with her over the phone for 10 minutes to explain our concern and rationale for the following recommendations:    Plan Summary:  -No plan for liver directed therapy at this time - continues to have response in liver with systemic therapy  -Consider switching systemic therapy (to my knowledge, patient has not been on FOLFIRI + srinivas) and prove stability or treatment response to lung lesions (min 3 months from now) prior to re-considering liver directed therapy. Alternatively, could even consider trialing antibiotics without a change in chemo if we suspect infectious/inflammatory etiology in the lungs, but this might risk progression of disease; will defer to local team  -Will see patient back (virtually) in 3mo after local team obtains new CT CAP and liver MRI    All questions were answered to their apparent satisfaction, and contact information was provided should additional questions or concerns arise.        Aldair Sylvester MD MPHS      Medical Decision Making   Total time spent was 120 minutes, including but not limited to patient-facing time, chart review, review of tests/studies as noted above, and care coordination.             History of Present Illness    Vern Oconnor is a 49 year old male who is being  followed for possible liver directed therapy for metastatic rectal cancer    He is joined today by his wife.    UPDATES:   He is currently on a q3 week regimen with capecitabine and Avastin. He has been on this regimen since the spring (March or April). He had some time off treatment when he dealt with a perianal abscess, which he believes was also around spring and then following that they resumed this treatment regimen of capecitabine and Avastin.    Most recent CEA 1.9, hovering around 1.5 - 2 recently.    Last colonoscopy September 2023 (at time of diagnosis).    Most recent imaging, read at outside hospital, showed (on MRI) stable innumerable liver lesions and (on chest CT) stable pulmonary nodules, 2-5mm each. On pelvic MRI, his rectal tumor was not appreciable.     Results Reviewed:  Multiple outside provider notes reviewed including 1 scanned in dated 11/15/2024, as well as most recent MRI results from October 2024    I, Aldair Sylvester, personally reviewed the above studies.          Virtual Visit Details    Type of service:  Video Visit     Originating Location (pt. Location): Home    Distant Location (provider location):  On-site  Platform used for Video Visit: AmWell      Again, thank you for allowing me to participate in the care of your patient.        Sincerely,        Aldair Sylvester MD

## 2025-02-11 ENCOUNTER — TRANSFERRED RECORDS (OUTPATIENT)
Dept: HEALTH INFORMATION MANAGEMENT | Facility: CLINIC | Age: 50
End: 2025-02-11
Payer: COMMERCIAL

## 2025-02-19 NOTE — PROGRESS NOTES
Virtual Visit Details    Type of service:  Video Visit     Originating Location (pt. Location): Home    Distant Location (provider location):  On-site  Platform used for Video Visit: [facility]  [address] [address]  Phone: [phone]  Fax: [phone]  Department of Surgery  Division of Surgical Oncology    Follow-Up    Patient:  Vren Oconnor, Date of birth 1975  Date of Visit:  02/19/2025  Referring Provider Referred Self      Assessment & Plan      Vern Oconnor is a 49 year old male with metastatic rectal cancer, first seen December 2023, with fairly stable disease    Vern is doing well on chemotherapy.  I am concerned that eventually his cancer will probably stop responding to his chemotherapy.  He has been relatively fortunate to have a year and a half of stable disease.  He and his wife asked excellent questions today and is certainly possible that pushing forward with more aggressive interventions may not improve his current circumstance but try and local therapies may be the only way to prove if he could possibly be cured from his cancer.    From metastatic colorectal cancer particularly if it is in 2 additional sites - is unclear if he has active disease in his lungs or not -is less than 20% likely but is still possible but only to a combination of systemic chemotherapy and surgery.  I did make it clear that systemic chemotherapy alone is only likely to cure patients in less than 4% of cases.  His MRI looks quite clear to me that this is still active disease.    His most recent scans were 2 months old but again it looks like he is achieved somewhat maximal response.  He does not really have any other additional good lines of chemotherapy.  Looking at his scans, it is possible he could have a staged ablation approach.  He has so many lesions I do not think it would be safe for possible to ablate all these at once so I may wish to  do this over multiple procedures.  It is unclear whether or not we would need to include hepatic artery infusion pump chemotherapy at any point between or after staged procedures.  It is essentially too early to tell but I do think that, if the patient is interested in pushing forward for aggressive care to intent approach, we should consider doing these interventions soon.  He is scheduled to see the medical oncology team and get repeat scans at [facility] at the end of March.  I like to see him after he sees their team and gets updated scans.    Plan Summary  -Repeat video visit on 3/31 to review [facility] CT scans on 3/21  -MRI is currently scheduled locally for March 11th - patient/wife will request to have [facility] perform that as well    All questions were answered to their apparent satisfaction, and contact information was provided should additional questions or concerns arise.        Aldair Sylvester MD MPHS      Medical Decision Making    Total time spent was 45 minutes, including but not limited to patient-facing time, chart review, review of tests/studies as noted above, and care coordination.             History of Present Illness     Vern Oconnor is a 49 year old male who is being followed for metastatic colorectal cancer      He is joined today by his wife.    UPDATES:     CEA values -   11/15 - 1.9  12/9 - 2.4  12/30 - 3.1  1/20 - 4.4  2/10 - 2.8    Currently on capecitabine and bevacizumab. Doing well, no side effects from this treatment but still with residual neuropathy.    I did not have medical records from [facility] available to me at the time of our visit but did obtain them later in the day. Cape + srinivas was started in April 2024. There has been some evidence of G1 liver injury from capecitabine. He has also had and-and-foot syndrome and persistent neuropathy.    Results Reviewed:  MRI and CT scan from December 2024    I, Aldair Sylvester, personally reviewed the above studies.

## 2025-02-20 ENCOUNTER — PATIENT OUTREACH (OUTPATIENT)
Dept: SURGERY | Facility: CLINIC | Age: 50
End: 2025-02-20
Payer: COMMERCIAL

## 2025-02-24 ENCOUNTER — VIRTUAL VISIT (OUTPATIENT)
Dept: SURGERY | Facility: CLINIC | Age: 50
End: 2025-02-24
Attending: SURGERY
Payer: COMMERCIAL

## 2025-02-24 VITALS — BODY MASS INDEX: 32.27 KG/M2 | WEIGHT: 265 LBS | HEIGHT: 76 IN

## 2025-02-24 DIAGNOSIS — C78.7 ADENOCARCINOMA OF RECTUM METASTATIC TO LIVER (H): Primary | ICD-10-CM

## 2025-02-24 DIAGNOSIS — C20 ADENOCARCINOMA OF RECTUM METASTATIC TO LIVER (H): Primary | ICD-10-CM

## 2025-02-24 ASSESSMENT — PAIN SCALES - GENERAL: PAINLEVEL_OUTOF10: SEVERE PAIN (10)

## 2025-02-24 NOTE — NURSING NOTE
Current patient location: [address] [place] [address]  [place] [address]    Is the patient currently in the state of [place]? NO, in [place] where they live    Visit mode: VIDEO    If the visit is dropped, the patient can be reconnected by:VIDEO VISIT: Text to cell phone:   Telephone Information:   Mobile [phone]    and VIDEO VISIT: Send to e-mail at: [email or web address]    Will anyone else be joining the visit? Kecia-Pt Wife   (If patient encounters technical issues they should call [phone])    Are changes needed to the allergy or medication list? No    Are refills needed on medications prescribed by this physician? NO    Rooming Documentation:  Unable to complete questionnaire(s) due to time    Reason for visit: RECHECK (Next steps)    Nicci MARIANO

## 2025-02-24 NOTE — LETTER
2/24/2025      Vern Oconnor  [address]   [place] [address]      Dear Colleague,    Thank you for referring your patient, Vern Oconnor, to the [facility]. Please see a copy of my visit note below.    Virtual Visit Details    Type of service:  Video Visit     Originating Location (pt. Location): Home    Distant Location (provider location):  On-site  Platform used for Video Visit: [facility]  [address] [place] [address]  Phone: [phone]  Fax: [phone]  Department of Surgery  Division of Surgical Oncology    Follow-Up    Patient:  Vern Oconnor, Date of birth 1975  Date of Visit:  02/19/2025  Referring Provider Referred Self      Assessment & Plan     Vern Oconnor is a 49 year old male with metastatic rectal cancer, first seen December 2023, with fairly stable disease    Vern is doing well on chemotherapy.  I am concerned that eventually his cancer will probably stop responding to his chemotherapy.  He has been relatively fortunate to have a year and a half of stable disease.  He and his wife asked excellent questions today and is certainly possible that pushing forward with more aggressive interventions may not improve his current circumstance but try and local therapies may be the only way to prove if he could possibly be cured from his cancer.    From metastatic colorectal cancer particularly if it is in 2 additional sites - is unclear if he has active disease in his lungs or not -is less than 20% likely but is still possible but only to a combination of systemic chemotherapy and surgery.  I did make it clear that systemic chemotherapy alone is only likely to cure patients in less than 4% of cases.  His MRI looks quite clear to me that this is still active disease.    His most recent scans were 2 months old but again it looks like he is achieved somewhat maximal response.  He does not really have any other  additional good lines of chemotherapy.  Looking at his scans, it is possible he could have a staged ablation approach.  He has so many lesions I do not think it would be safe for possible to ablate all these at once so I may wish to do this over multiple procedures.  It is unclear whether or not we would need to include hepatic artery infusion pump chemotherapy at any point between or after staged procedures.  It is essentially too early to tell but I do think that, if the patient is interested in pushing forward for aggressive care to intent approach, we should consider doing these interventions soon.  He is scheduled to see the medical oncology team and get repeat scans at [facility] at the end of March.  I like to see him after he sees their team and gets updated scans.    Plan Summary  -Repeat video visit on 3/31 to review [facility] CT scans on 3/21  -MRI is currently scheduled locally for March 11th - patient/wife will request to have [facility] perform that as well    All questions were answered to their apparent satisfaction, and contact information was provided should additional questions or concerns arise.        Aldair Sylvester MD MPHS      Medical Decision Making   Total time spent was 45 minutes, including but not limited to patient-facing time, chart review, review of tests/studies as noted above, and care coordination.             History of Present Illness    Vern Oconnor is a 49 year old male who is being followed for metastatic colorectal cancer      He is joined today by his wife.    UPDATES:     CEA values -   11/15 - 1.9  12/9 - 2.4  12/30 - 3.1  1/20 - 4.4  2/10 - 2.8    Currently on capecitabine and bevacizumab. Doing well, no side effects from this treatment but still with residual neuropathy.    Results Reviewed:  MRI and CT scan from December 2024    I, Aldair Sylvester, personally reviewed the above studies.            Again, thank you for allowing me to participate in the care of your patient.         Sincerely,        Aldair Sylvester MD    Electronically signed

## 2025-03-16 ENCOUNTER — HEALTH MAINTENANCE LETTER (OUTPATIENT)
Age: 50
End: 2025-03-16

## 2025-03-25 NOTE — PROGRESS NOTES
Virtual Visit Details    Type of service:  Video Visit     Originating Location (pt. Location): Home    Distant Location (provider location):  On-site  Platform used for Video Visit: [facility]  [address] [facility] [address]  Phone: [phone]  Fax: [phone]  Department of Surgery  Division of Surgical Oncology    Follow-Up    Patient:  Vern Oconnor, Date of birth 1975  Date of Visit:  03/25/2025  Referring Provider Referred Self      Assessment & Plan      Vern Oconnor is a 49 year old male with metastatic rectal cancer, first diagnosed September 2023.    Unfortunately, Vern' recent scans at [facility] revealed progression of disease. Importantly, he now has fairly convincing evidence of significant disease in his adrenal gland and abdominal lymph nodes; we have been watching these sites for some time but now the rate of growth and the imaging appearance are very concerning for / consistent with extrahepatic extrapulmonary progression. I explained to him and his wife that this evidence of extrahepatic disease means that we need to focus on systemic treatments and not on liver-directed therapy; any major operations like resections, ablations, or hepatic artery infusion pumps are not going to offer any control to extrahepatic sites of disease.    I agree with the plan for FOLFIRI + srinivas. I will defer to his local oncologist for further care. Should he experience progression on FOLFIRI + srinivas, I encouraged Vern and his wife to reach out to Dr. Sandra and/or [facility] to discuss clinical trial options.    Plan Summary:  -With evidence of extrahepatic disease, I am recommending systemic-therapy alone; follow-up with me as needed    All questions were answered to their apparent satisfaction, and contact information was provided should additional questions or concerns arise.        Aldair Sylvester MD MPHS      Medical Decision Making    Total time  spent was 45 minutes, including but not limited to patient-facing time, chart review, review of tests/studies as noted above, and care coordination.             History of Present Illness     He is joined today by his wife.    UPDATES:   Vern was seen at [facility] on 3/21, and I asked to see him after that visit. He is currently on maintenance cape + srinivas.     MRI and CT done at [facility] showed progression of disease including in the liver, abdominal lymph nodes, and right adrenal gland. They have made a recommendation to escalate therapy to FOLFIRI + srinivas.    Overall, he is doing ok. Neuropathy is still bad. FOLFIRI + srinivas first round gave him a lot of fatigue.    Results Reviewed:  Liver MRI and CT CAP from [facility], earlier this month, both reviewed    I, Aldair Sylvester, personally reviewed the above studies.

## 2025-03-31 ENCOUNTER — VIRTUAL VISIT (OUTPATIENT)
Dept: SURGERY | Facility: CLINIC | Age: 50
End: 2025-03-31
Attending: SURGERY
Payer: COMMERCIAL

## 2025-03-31 DIAGNOSIS — C20 ADENOCARCINOMA OF RECTUM METASTATIC TO LIVER (H): Primary | ICD-10-CM

## 2025-03-31 DIAGNOSIS — C78.7 ADENOCARCINOMA OF RECTUM METASTATIC TO LIVER (H): Primary | ICD-10-CM

## 2025-03-31 PROCEDURE — 98007 SYNCH AUDIO-VIDEO EST HI 40: CPT | Performed by: SURGERY

## 2025-03-31 NOTE — NURSING NOTE
Patient refused rooming.       Current patient location: [address] [place] [address] [place] [address]    Is the patient currently in the state of [place]? NO    Visit mode: VIDEO    If the visit is dropped, the patient can be reconnected by:VIDEO VISIT: Text to cell phone:   Telephone Information:   Mobile [phone]       Will anyone else be joining the visit? Kecia-Pt's Wife   (If patient encounters technical issues they should call [phone])    Are changes needed to the allergy or medication list?  Patient refused review    Are refills needed on medications prescribed by this physician? Discuss with provider    Rooming Documentation:  Patient declined to complete questionnaire(s)    Reason for visit: RECHECK (Had scans at [facility] on 3/21/25)    Nicci MARIANO

## 2025-03-31 NOTE — LETTER
3/31/2025      Vern Oconnor  [address]   [place] [address]      Dear Colleague,    Thank you for referring your patient, Vern Oconnor, to the [facility]. Please see a copy of my visit note below.    Virtual Visit Details    Type of service:  Video Visit     Originating Location (pt. Location): Home    Distant Location (provider location):  On-site  Platform used for Video Visit: [facility] [address] [address]  Phone: [phone]  Fax: [phone]  Department of Surgery  Division of Surgical Oncology    Follow-Up    Patient:  Vern Oconnor, Date of birth 1975  Date of Visit:  03/25/2025  Referring Provider Referred Self      Assessment & Plan     Vern Oconnor is a 49 year old male with metastatic rectal cancer, first diagnosed September 2023.    Unfortunately, Vern' recent scans at [facility] revealed progression of disease. Importantly, he now has fairly convincing evidence of significant disease in his adrenal gland and abdominal lymph nodes; we have been watching these sites for some time but now the rate of growth and the imaging appearance are very concerning for / consistent with extrahepatic extrapulmonary progression. I explained to him and his wife that this evidence of extrahepatic disease means that we need to focus on systemic treatments and not on liver-directed therapy; any major operations like resections, ablations, or hepatic artery infusion pumps are not going to offer any control to extrahepatic sites of disease.    I agree with the plan for FOLFIRI + srinivas. I will defer to his local oncologist for further care. Should he experience progression on FOLFIRI + srinivas, I encouraged Vern and his wife to reach out to Dr. Sandra and/or [facility] to discuss clinical trial options.    Plan Summary:  -With evidence of extrahepatic disease, I am recommending systemic-therapy alone; follow-up with me as  needed    All questions were answered to their apparent satisfaction, and contact information was provided should additional questions or concerns arise.        Aldair Sylvester MD MPHS      Medical Decision Making   Total time spent was 45 minutes, including but not limited to patient-facing time, chart review, review of tests/studies as noted above, and care coordination.             History of Present Illness    He is joined today by his wife.    UPDATES:   Vern was seen at [facility] on 3/21, and I asked to see him after that visit. He is currently on maintenance cape + srinivas.     MRI and CT done at [facility] showed progression of disease including in the liver, abdominal lymph nodes, and right adrenal gland. They have made a recommendation to escalate therapy to FOLFIRI + srinivas.    Overall, he is doing ok. Neuropathy is still bad. FOLFIRI + srinivas first round gave him a lot of fatigue.    Results Reviewed:  Liver MRI and CT CAP from [facility], earlier this month, both reviewed    I, Aldair Sylvester, personally reviewed the above studies.            Again, thank you for allowing me to participate in the care of your patient.        Sincerely,        Aldair Sylvester MD    Electronically signed